# Patient Record
Sex: MALE | Race: OTHER | HISPANIC OR LATINO
[De-identification: names, ages, dates, MRNs, and addresses within clinical notes are randomized per-mention and may not be internally consistent; named-entity substitution may affect disease eponyms.]

---

## 2024-01-08 PROBLEM — Z00.00 ENCOUNTER FOR PREVENTIVE HEALTH EXAMINATION: Status: ACTIVE | Noted: 2024-01-08

## 2024-01-29 ENCOUNTER — NON-APPOINTMENT (OUTPATIENT)
Age: 59
End: 2024-01-29

## 2024-01-29 ENCOUNTER — APPOINTMENT (OUTPATIENT)
Dept: HEART AND VASCULAR | Facility: CLINIC | Age: 59
End: 2024-01-29
Payer: MEDICAID

## 2024-01-29 VITALS
SYSTOLIC BLOOD PRESSURE: 151 MMHG | HEART RATE: 81 BPM | DIASTOLIC BLOOD PRESSURE: 99 MMHG | WEIGHT: 200 LBS | HEIGHT: 65 IN | BODY MASS INDEX: 33.32 KG/M2

## 2024-01-29 DIAGNOSIS — I48.3 TYPICAL ATRIAL FLUTTER: ICD-10-CM

## 2024-01-29 DIAGNOSIS — F17.290 NICOTINE DEPENDENCE, OTHER TOBACCO PRODUCT, UNCOMPLICATED: ICD-10-CM

## 2024-01-29 PROCEDURE — 99204 OFFICE O/P NEW MOD 45 MIN: CPT | Mod: 25

## 2024-01-29 PROCEDURE — 93000 ELECTROCARDIOGRAM COMPLETE: CPT

## 2024-01-29 RX ORDER — ATORVASTATIN CALCIUM 80 MG/1
TABLET, FILM COATED ORAL
Refills: 0 | Status: ACTIVE | COMMUNITY

## 2024-01-29 RX ORDER — LOSARTAN POTASSIUM 50 MG/1
50 TABLET, FILM COATED ORAL
Refills: 0 | Status: ACTIVE | COMMUNITY

## 2024-01-29 RX ORDER — ASPIRIN 81 MG
81 TABLET, DELAYED RELEASE (ENTERIC COATED) ORAL
Refills: 0 | Status: DISCONTINUED | COMMUNITY
End: 2024-01-29

## 2024-01-29 RX ORDER — METOPROLOL SUCCINATE 25 MG/1
25 TABLET, EXTENDED RELEASE ORAL
Refills: 0 | Status: ACTIVE | COMMUNITY

## 2024-02-12 NOTE — HISTORY OF PRESENT ILLNESS
[FreeTextEntry1] : Mr. Maria Teresa Villa is a  pleasant 58 year-old Amharic speaking male with a past medical history significant for HTN, HLD and atrial flutter who is referred by Dr. Rivera for initial evaluation.   He noted new onset PICKETT in Fall 2023.  Evaluated by Dr. Rivera and found to be in atrial flutter.  He denies awareness of rapid/irregular heart action, dizziness, presyncope/syncope.   Tolerating Eliquis without bleeding issues.

## 2024-02-12 NOTE — DISCUSSION/SUMMARY
[FreeTextEntry1] : Mr. Maria Teresa Villa is a  pleasant 58 year-old Chinese speaking male with a past medical history significant for HTN, HLD and atrial flutter who presents for initial evaluation.  We discussed in detail the normal conduction system of the heart and what atrial flutter is.  We discussed treatment options including rate control vs. rhythm control with antiarrhythmic medications or an ablation.    We discussed the procedure in detail including risks, benefits, and alternatives.  Risks including, but not limited to; infection, anesthesia reaction, bleeding, pain, vascular injury, cardiac perforation, TE/CVA,  arrhythmia recurrence and death were discussed.  All questions answered and the patient was given pre procedure instructions.  Advised to call with any questions or concerns.

## 2024-02-12 NOTE — ADDENDUM
[FreeTextEntry1] : I, Bill Reyes, hereby attest that the medical record entry for this patient accurately reflects signatures/notations that I made on the Date of Service in my capacity as an Attending Physician when I treated/diagnosed the above patient. I do hereby attest that this information is true, accurate and complete to the best of my knowledge.  I was present for the entire visit and supervised the entire visit and made/agree with the plan as outlined.

## 2024-03-12 ENCOUNTER — RX RENEWAL (OUTPATIENT)
Age: 59
End: 2024-03-12

## 2024-03-12 RX ORDER — APIXABAN 5 MG/1
5 TABLET, FILM COATED ORAL
Qty: 60 | Refills: 5 | Status: ACTIVE | COMMUNITY
Start: 2024-01-29 | End: 1900-01-01

## 2024-04-03 ENCOUNTER — OUTPATIENT (OUTPATIENT)
Dept: OUTPATIENT SERVICES | Facility: HOSPITAL | Age: 59
LOS: 1 days | Discharge: HOME | End: 2024-04-03
Payer: COMMERCIAL

## 2024-04-03 ENCOUNTER — RESULT REVIEW (OUTPATIENT)
Age: 59
End: 2024-04-03

## 2024-04-03 VITALS — WEIGHT: 199.96 LBS | HEIGHT: 65 IN

## 2024-04-03 DIAGNOSIS — I48.92 UNSPECIFIED ATRIAL FLUTTER: ICD-10-CM

## 2024-04-03 LAB
ALBUMIN SERPL ELPH-MCNC: 4.7 G/DL — SIGNIFICANT CHANGE UP (ref 3.3–5)
ALP SERPL-CCNC: 62 U/L — SIGNIFICANT CHANGE UP (ref 40–120)
ALT FLD-CCNC: 26 U/L — SIGNIFICANT CHANGE UP (ref 10–45)
ANION GAP SERPL CALC-SCNC: 7 MMOL/L — SIGNIFICANT CHANGE UP (ref 5–17)
APTT BLD: 32.9 SEC — SIGNIFICANT CHANGE UP (ref 24.5–35.6)
AST SERPL-CCNC: 31 U/L — SIGNIFICANT CHANGE UP (ref 10–40)
BILIRUB SERPL-MCNC: 0.4 MG/DL — SIGNIFICANT CHANGE UP (ref 0.2–1.2)
BLD GP AB SCN SERPL QL: NEGATIVE — SIGNIFICANT CHANGE UP
BUN SERPL-MCNC: 26 MG/DL — HIGH (ref 7–23)
CALCIUM SERPL-MCNC: 10.1 MG/DL — SIGNIFICANT CHANGE UP (ref 8.4–10.5)
CHLORIDE SERPL-SCNC: 102 MMOL/L — SIGNIFICANT CHANGE UP (ref 96–108)
CO2 SERPL-SCNC: 29 MMOL/L — SIGNIFICANT CHANGE UP (ref 22–31)
CREAT SERPL-MCNC: 1.1 MG/DL — SIGNIFICANT CHANGE UP (ref 0.5–1.3)
EGFR: 78 ML/MIN/1.73M2 — SIGNIFICANT CHANGE UP
GLUCOSE SERPL-MCNC: 87 MG/DL — SIGNIFICANT CHANGE UP (ref 70–99)
HCT VFR BLD CALC: 46.3 % — SIGNIFICANT CHANGE UP (ref 39–50)
HGB BLD-MCNC: 15.3 G/DL — SIGNIFICANT CHANGE UP (ref 13–17)
INR BLD: 0.93 — SIGNIFICANT CHANGE UP (ref 0.85–1.18)
MCHC RBC-ENTMCNC: 31.9 PG — SIGNIFICANT CHANGE UP (ref 27–34)
MCHC RBC-ENTMCNC: 33 GM/DL — SIGNIFICANT CHANGE UP (ref 32–36)
MCV RBC AUTO: 96.7 FL — SIGNIFICANT CHANGE UP (ref 80–100)
NRBC # BLD: 0 /100 WBCS — SIGNIFICANT CHANGE UP (ref 0–0)
PLATELET # BLD AUTO: 195 K/UL — SIGNIFICANT CHANGE UP (ref 150–400)
POTASSIUM SERPL-MCNC: 4.4 MMOL/L — SIGNIFICANT CHANGE UP (ref 3.5–5.3)
POTASSIUM SERPL-SCNC: 4.4 MMOL/L — SIGNIFICANT CHANGE UP (ref 3.5–5.3)
PROT SERPL-MCNC: 7.9 G/DL — SIGNIFICANT CHANGE UP (ref 6–8.3)
PROTHROM AB SERPL-ACNC: 10.6 SEC — SIGNIFICANT CHANGE UP (ref 9.5–13)
RBC # BLD: 4.79 M/UL — SIGNIFICANT CHANGE UP (ref 4.2–5.8)
RBC # FLD: 12.2 % — SIGNIFICANT CHANGE UP (ref 10.3–14.5)
RH IG SCN BLD-IMP: POSITIVE — SIGNIFICANT CHANGE UP
SODIUM SERPL-SCNC: 138 MMOL/L — SIGNIFICANT CHANGE UP (ref 135–145)
WBC # BLD: 7.39 K/UL — SIGNIFICANT CHANGE UP (ref 3.8–10.5)
WBC # FLD AUTO: 7.39 K/UL — SIGNIFICANT CHANGE UP (ref 3.8–10.5)

## 2024-04-03 PROCEDURE — 93312 ECHO TRANSESOPHAGEAL: CPT | Mod: 26

## 2024-04-03 PROCEDURE — 85610 PROTHROMBIN TIME: CPT

## 2024-04-03 PROCEDURE — 93320 DOPPLER ECHO COMPLETE: CPT | Mod: 26

## 2024-04-03 PROCEDURE — 86901 BLOOD TYPING SEROLOGIC RH(D): CPT

## 2024-04-03 PROCEDURE — 85027 COMPLETE CBC AUTOMATED: CPT

## 2024-04-03 PROCEDURE — 36415 COLL VENOUS BLD VENIPUNCTURE: CPT

## 2024-04-03 PROCEDURE — C1730: CPT

## 2024-04-03 PROCEDURE — 85730 THROMBOPLASTIN TIME PARTIAL: CPT

## 2024-04-03 PROCEDURE — 93312 ECHO TRANSESOPHAGEAL: CPT

## 2024-04-03 PROCEDURE — 86850 RBC ANTIBODY SCREEN: CPT

## 2024-04-03 PROCEDURE — 76376 3D RENDER W/INTRP POSTPROCES: CPT | Mod: 26

## 2024-04-03 PROCEDURE — C1760: CPT

## 2024-04-03 PROCEDURE — 93653 COMPRE EP EVAL TX SVT: CPT

## 2024-04-03 PROCEDURE — C2630: CPT

## 2024-04-03 PROCEDURE — 80053 COMPREHEN METABOLIC PANEL: CPT

## 2024-04-03 PROCEDURE — 86900 BLOOD TYPING SEROLOGIC ABO: CPT

## 2024-04-03 PROCEDURE — C1766: CPT

## 2024-04-03 PROCEDURE — C1894: CPT

## 2024-04-03 RX ORDER — ATORVASTATIN CALCIUM 80 MG/1
1 TABLET, FILM COATED ORAL
Refills: 0 | DISCHARGE

## 2024-04-03 RX ORDER — LOSARTAN POTASSIUM 100 MG/1
1 TABLET, FILM COATED ORAL
Refills: 0 | DISCHARGE

## 2024-04-03 RX ORDER — METOPROLOL TARTRATE 50 MG
1 TABLET ORAL
Refills: 0 | DISCHARGE

## 2024-04-03 RX ORDER — APIXABAN 2.5 MG/1
1 TABLET, FILM COATED ORAL
Refills: 0 | DISCHARGE

## 2024-04-03 RX ORDER — ATORVASTATIN CALCIUM 80 MG/1
40 TABLET, FILM COATED ORAL AT BEDTIME
Refills: 0 | Status: DISCONTINUED | OUTPATIENT
Start: 2024-04-03 | End: 2024-04-03

## 2024-04-03 RX ORDER — APIXABAN 2.5 MG/1
5 TABLET, FILM COATED ORAL ONCE
Refills: 0 | Status: DISCONTINUED | OUTPATIENT
Start: 2024-04-03 | End: 2024-04-03

## 2024-04-03 RX ORDER — METOPROLOL TARTRATE 50 MG
25 TABLET ORAL DAILY
Refills: 0 | Status: DISCONTINUED | OUTPATIENT
Start: 2024-04-03 | End: 2024-04-03

## 2024-04-03 RX ORDER — APIXABAN 2.5 MG/1
5 TABLET, FILM COATED ORAL
Refills: 0 | Status: DISCONTINUED | OUTPATIENT
Start: 2024-04-03 | End: 2024-04-03

## 2024-04-03 NOTE — H&P ADULT - ASSESSMENT
58 year-old Sudanese speaking male with a past medical history significant for HTN, HLD and atrial flutter who presents for scheduled ablation.

## 2024-04-03 NOTE — H&P ADULT - HISTORY OF PRESENT ILLNESS
58 year-old Wolof speaking male with a past medical history significant for HTN, HLD and atrial flutter who presents for scheduled ablation.   He noted new onset PICKETT in Fall 2023. Evaluated by Dr. Rivera and found to be in atrial flutter. He denies awareness of rapid/irregular heart action, dizziness, presyncope/syncope. Tolerating Eliquis without bleeding issues.  ?

## 2024-04-04 PROBLEM — E78.5 HYPERLIPIDEMIA, UNSPECIFIED: Chronic | Status: ACTIVE | Noted: 2024-04-03

## 2024-04-04 PROBLEM — I10 ESSENTIAL (PRIMARY) HYPERTENSION: Chronic | Status: ACTIVE | Noted: 2024-04-03

## 2024-04-15 ENCOUNTER — NON-APPOINTMENT (OUTPATIENT)
Age: 59
End: 2024-04-15

## 2024-04-15 ENCOUNTER — APPOINTMENT (OUTPATIENT)
Dept: HEART AND VASCULAR | Facility: CLINIC | Age: 59
End: 2024-04-15
Payer: COMMERCIAL

## 2024-04-15 VITALS
DIASTOLIC BLOOD PRESSURE: 85 MMHG | WEIGHT: 200 LBS | TEMPERATURE: 97.8 F | HEIGHT: 65 IN | HEART RATE: 56 BPM | BODY MASS INDEX: 33.32 KG/M2 | SYSTOLIC BLOOD PRESSURE: 136 MMHG | OXYGEN SATURATION: 99 %

## 2024-04-15 PROCEDURE — 99213 OFFICE O/P EST LOW 20 MIN: CPT | Mod: 25

## 2024-04-15 PROCEDURE — 93000 ELECTROCARDIOGRAM COMPLETE: CPT

## 2024-04-15 NOTE — HISTORY OF PRESENT ILLNESS
[FreeTextEntry1] : Mr. Maria Teresa Villa is a  pleasant 58 year-old Yi speaking male with a past medical history significant for HTN, HLD and atrial flutter S/P CTI ablation 4/3/24.  He presents for post procedure follow-up and feels well.  Notes improvement in PICKETT.  He denies awareness of rapid/irregular heart action, dizziness, presyncope/syncope.   Tolerating Eliquis without bleeding issues.

## 2024-04-15 NOTE — ADDENDUM
[FreeTextEntry1] : I, Florina Wilkinson, am scribing for and the presence of Dr. Reyes the following sections: HPI, PMH,Family/social history, ROS, Physical Exam, Assessment / Plan.   I, Bill Reyes, personally performed the services described in the documentation, reviewed the documentation recorded by the scribe in my presence and it accurately and completely records my words and actions.

## 2024-04-15 NOTE — DISCUSSION/SUMMARY
[FreeTextEntry1] : Mr. Maria Teresa Villa is a  pleasant 58 year-old Tajik speaking male with a past medical history significant for HTN, HLD and atrial flutter S/P CTI ablation 4/3/24.   1.  AFL  Maintaining sinus rhythm post procedure  Continue Metoprolol for rate control  2.  Stroke Risk Continue Eliquis - CHADS VASc 1  3.  Arrhythmia surveillance Recommend ILR placement.  He will consider this.   F/U 3 months, sooner as needed

## 2024-06-03 ENCOUNTER — NON-APPOINTMENT (OUTPATIENT)
Age: 59
End: 2024-06-03

## 2024-06-03 ENCOUNTER — APPOINTMENT (OUTPATIENT)
Dept: HEART AND VASCULAR | Facility: CLINIC | Age: 59
End: 2024-06-03
Payer: MEDICAID

## 2024-06-03 VITALS
HEIGHT: 65 IN | BODY MASS INDEX: 33.32 KG/M2 | DIASTOLIC BLOOD PRESSURE: 79 MMHG | HEART RATE: 58 BPM | SYSTOLIC BLOOD PRESSURE: 132 MMHG | WEIGHT: 200 LBS

## 2024-06-03 PROCEDURE — 99213 OFFICE O/P EST LOW 20 MIN: CPT | Mod: 25

## 2024-06-03 PROCEDURE — 93000 ELECTROCARDIOGRAM COMPLETE: CPT

## 2024-06-06 NOTE — DISCUSSION/SUMMARY
[FreeTextEntry1] : Mr. Maria Teresa Villa is a  pleasant 58 year-old Lao speaking male with a past medical history significant for HTN, HLD and atrial flutter S/P CTI ablation 4/3/24.   1.  AFL  Maintaining sinus rhythm post procedure  Continue Metoprolol for rate control  2.  Stroke Risk Continue Eliquis - CHADS VASc 1  3.  Arrhythmia surveillance He is amenable to ILR placement and will call to schedule  F/U 1 month post procedure, sooner as needed

## 2024-06-06 NOTE — HISTORY OF PRESENT ILLNESS
[FreeTextEntry1] : Mr. Maria Teresa Villa is a pleasant 58 year-old Urdu speaking male with a past medical history significant for HTN, HLD and atrial flutter S/P CTI ablation 4/3/24.    Notes improvement in PICKETT post procedure.  He denies awareness of rapid/irregular heart action, dizziness, presyncope/syncope.   Tolerating Eliquis without bleeding issues.  LTM vs ILR for heart rhythm monitoring discussed at prior visit and he declined.  Normal LV systolic function on ULICES 4/3/24.

## 2024-10-14 ENCOUNTER — RX RENEWAL (OUTPATIENT)
Age: 59
End: 2024-10-14

## 2025-03-26 ENCOUNTER — RX RENEWAL (OUTPATIENT)
Age: 60
End: 2025-03-26

## 2025-05-05 ENCOUNTER — NON-APPOINTMENT (OUTPATIENT)
Age: 60
End: 2025-05-05

## 2025-05-05 ENCOUNTER — APPOINTMENT (OUTPATIENT)
Dept: HEART AND VASCULAR | Facility: CLINIC | Age: 60
End: 2025-05-05
Payer: MEDICAID

## 2025-05-05 VITALS
WEIGHT: 218 LBS | SYSTOLIC BLOOD PRESSURE: 135 MMHG | BODY MASS INDEX: 36.32 KG/M2 | OXYGEN SATURATION: 97 % | HEIGHT: 65 IN | TEMPERATURE: 97.9 F | HEART RATE: 57 BPM | DIASTOLIC BLOOD PRESSURE: 80 MMHG

## 2025-05-05 DIAGNOSIS — I48.3 TYPICAL ATRIAL FLUTTER: ICD-10-CM

## 2025-05-05 PROCEDURE — 93000 ELECTROCARDIOGRAM COMPLETE: CPT

## 2025-05-05 PROCEDURE — 99214 OFFICE O/P EST MOD 30 MIN: CPT | Mod: 25

## 2025-06-10 ENCOUNTER — OUTPATIENT (OUTPATIENT)
Dept: OUTPATIENT SERVICES | Facility: HOSPITAL | Age: 60
LOS: 1 days | End: 2025-06-10

## 2025-06-10 ENCOUNTER — EMERGENCY (EMERGENCY)
Facility: HOSPITAL | Age: 60
LOS: 1 days | End: 2025-06-10
Attending: STUDENT IN AN ORGANIZED HEALTH CARE EDUCATION/TRAINING PROGRAM | Admitting: STUDENT IN AN ORGANIZED HEALTH CARE EDUCATION/TRAINING PROGRAM
Payer: COMMERCIAL

## 2025-06-10 VITALS
TEMPERATURE: 98 F | HEART RATE: 54 BPM | SYSTOLIC BLOOD PRESSURE: 130 MMHG | HEIGHT: 65 IN | OXYGEN SATURATION: 97 % | RESPIRATION RATE: 18 BRPM | WEIGHT: 214.95 LBS | DIASTOLIC BLOOD PRESSURE: 88 MMHG

## 2025-06-10 VITALS
RESPIRATION RATE: 18 BRPM | OXYGEN SATURATION: 99 % | SYSTOLIC BLOOD PRESSURE: 145 MMHG | HEART RATE: 52 BPM | TEMPERATURE: 97 F | DIASTOLIC BLOOD PRESSURE: 88 MMHG

## 2025-06-10 LAB
ANION GAP SERPL CALC-SCNC: 8 MMOL/L — SIGNIFICANT CHANGE UP (ref 5–17)
BASOPHILS # BLD AUTO: 0.04 K/UL — SIGNIFICANT CHANGE UP (ref 0–0.2)
BASOPHILS NFR BLD AUTO: 0.6 % — SIGNIFICANT CHANGE UP (ref 0–2)
BUN SERPL-MCNC: 15 MG/DL — SIGNIFICANT CHANGE UP (ref 7–23)
CALCIUM SERPL-MCNC: 9.6 MG/DL — SIGNIFICANT CHANGE UP (ref 8.4–10.5)
CHLORIDE SERPL-SCNC: 104 MMOL/L — SIGNIFICANT CHANGE UP (ref 96–108)
CO2 SERPL-SCNC: 24 MMOL/L — SIGNIFICANT CHANGE UP (ref 22–31)
CREAT SERPL-MCNC: 1.02 MG/DL — SIGNIFICANT CHANGE UP (ref 0.5–1.3)
EGFR: 85 ML/MIN/1.73M2 — SIGNIFICANT CHANGE UP
EGFR: 85 ML/MIN/1.73M2 — SIGNIFICANT CHANGE UP
EOSINOPHIL # BLD AUTO: 0.13 K/UL — SIGNIFICANT CHANGE UP (ref 0–0.5)
EOSINOPHIL NFR BLD AUTO: 2 % — SIGNIFICANT CHANGE UP (ref 0–6)
GLUCOSE SERPL-MCNC: 92 MG/DL — SIGNIFICANT CHANGE UP (ref 70–99)
HCT VFR BLD CALC: 42.3 % — SIGNIFICANT CHANGE UP (ref 39–50)
HGB BLD-MCNC: 14.3 G/DL — SIGNIFICANT CHANGE UP (ref 13–17)
IMM GRANULOCYTES NFR BLD AUTO: 0.3 % — SIGNIFICANT CHANGE UP (ref 0–0.9)
LYMPHOCYTES # BLD AUTO: 1.4 K/UL — SIGNIFICANT CHANGE UP (ref 1–3.3)
LYMPHOCYTES # BLD AUTO: 21.5 % — SIGNIFICANT CHANGE UP (ref 13–44)
MCHC RBC-ENTMCNC: 32.4 PG — SIGNIFICANT CHANGE UP (ref 27–34)
MCHC RBC-ENTMCNC: 33.8 G/DL — SIGNIFICANT CHANGE UP (ref 32–36)
MCV RBC AUTO: 95.7 FL — SIGNIFICANT CHANGE UP (ref 80–100)
MONOCYTES # BLD AUTO: 0.85 K/UL — SIGNIFICANT CHANGE UP (ref 0–0.9)
MONOCYTES NFR BLD AUTO: 13.1 % — SIGNIFICANT CHANGE UP (ref 2–14)
NEUTROPHILS # BLD AUTO: 4.07 K/UL — SIGNIFICANT CHANGE UP (ref 1.8–7.4)
NEUTROPHILS NFR BLD AUTO: 62.5 % — SIGNIFICANT CHANGE UP (ref 43–77)
NRBC BLD AUTO-RTO: 0 /100 WBCS — SIGNIFICANT CHANGE UP (ref 0–0)
PLATELET # BLD AUTO: 177 K/UL — SIGNIFICANT CHANGE UP (ref 150–400)
POTASSIUM SERPL-MCNC: 4.6 MMOL/L — SIGNIFICANT CHANGE UP (ref 3.5–5.3)
POTASSIUM SERPL-MCNC: SIGNIFICANT CHANGE UP (ref 3.5–5.3)
POTASSIUM SERPL-SCNC: 4.6 MMOL/L — SIGNIFICANT CHANGE UP (ref 3.5–5.3)
POTASSIUM SERPL-SCNC: SIGNIFICANT CHANGE UP (ref 3.5–5.3)
RBC # BLD: 4.42 M/UL — SIGNIFICANT CHANGE UP (ref 4.2–5.8)
RBC # FLD: 12.8 % — SIGNIFICANT CHANGE UP (ref 10.3–14.5)
SODIUM SERPL-SCNC: 136 MMOL/L — SIGNIFICANT CHANGE UP (ref 135–145)
TROPONIN T, HIGH SENSITIVITY RESULT: <6 NG/L — SIGNIFICANT CHANGE UP (ref 0–51)
WBC # BLD: 6.51 K/UL — SIGNIFICANT CHANGE UP (ref 3.8–10.5)
WBC # FLD AUTO: 6.51 K/UL — SIGNIFICANT CHANGE UP (ref 3.8–10.5)

## 2025-06-10 PROCEDURE — 85025 COMPLETE CBC W/AUTO DIFF WBC: CPT

## 2025-06-10 PROCEDURE — 99284 EMERGENCY DEPT VISIT MOD MDM: CPT | Mod: 25

## 2025-06-10 PROCEDURE — 71045 X-RAY EXAM CHEST 1 VIEW: CPT | Mod: 26

## 2025-06-10 PROCEDURE — 96374 THER/PROPH/DIAG INJ IV PUSH: CPT

## 2025-06-10 PROCEDURE — 84484 ASSAY OF TROPONIN QUANT: CPT

## 2025-06-10 PROCEDURE — 84132 ASSAY OF SERUM POTASSIUM: CPT

## 2025-06-10 PROCEDURE — 99285 EMERGENCY DEPT VISIT HI MDM: CPT

## 2025-06-10 PROCEDURE — 71045 X-RAY EXAM CHEST 1 VIEW: CPT

## 2025-06-10 PROCEDURE — 80048 BASIC METABOLIC PNL TOTAL CA: CPT

## 2025-06-10 PROCEDURE — 36415 COLL VENOUS BLD VENIPUNCTURE: CPT

## 2025-06-10 RX ORDER — KETOROLAC TROMETHAMINE 30 MG/ML
30 INJECTION, SOLUTION INTRAMUSCULAR; INTRAVENOUS ONCE
Refills: 0 | Status: DISCONTINUED | OUTPATIENT
Start: 2025-06-10 | End: 2025-06-10

## 2025-06-10 RX ADMIN — KETOROLAC TROMETHAMINE 30 MILLIGRAM(S): 30 INJECTION, SOLUTION INTRAMUSCULAR; INTRAVENOUS at 17:57

## 2025-06-10 NOTE — ED PROVIDER NOTE - PROGRESS NOTE DETAILS
Patient is updated on lab and/or imaging results. Patient understands plan, return precaution and gives verbal feedback. Patient's symptoms are improved. Patient agrees to f/u with primary care and/or specialist. Discharge.

## 2025-06-10 NOTE — ED PROVIDER NOTE - PATIENT PORTAL LINK FT
You can access the FollowMyHealth Patient Portal offered by [facility] by registering at the following website: [email or web address]. By joining [facility]’s FollowMyHealth portal, you will also be able to view your health information using other applications (apps) compatible with our system.

## 2025-06-10 NOTE — ED PROVIDER NOTE - NSFOLLOWUPINSTRUCTIONS_ED_ALL_ED_FT
¿Qué necesito saber acerca del dolor de pecho?El dolor en el pecho puede ser provocado por alondra variedad de condiciones, algunas no tan serias y otras que son de peligro mortal. Es importante que programe alondra vanessa con hamm médico para determinar la causa de hamm dolor de pecho.    ¿Qué provoca o aumenta mi riesgo de tener dolor de pecho?    Un problema de digestión, chadd el reflujo ácido o alondra úlcera estomacal    Un ataque de ansiedad o alondra emoción kenny, chadd la maranda    Alondra infección, inflamación o fractura en los huesos o un cartílago en el pecho    Flujo de kaitlyn deficiente hacia el corazón (angina)    Alondra condición potencialmente mortal, chadd un ataque al corazón o un coágulo de kaitlyn en los pulmones  ¿Cuáles otros síntomas podrían presentarse con el dolor en el pecho?    Alondra sensación de ardor detrás del esternón    Ritmo cardíaco acelerado o lento    Fiebre o sudoración    Náuseas o vómitos    Falta de aliento    Molestia o presión que se propaga del pecho a la espalda, mandíbula o brazo    Sensación de debilidad, cansancio o desmayo  ¿Cómo se diagnostica la causa del dolor en el pecho?Hamm médico lo examinará. Describa hamm dolor en el pecho con el fab detalle que sea posible. Infórmele a hamm médico dónde se encuentra hamm dolor y cuándo comenzó. Coméntele si usted nota algo que hace empeorar o mejorar el dolor. Informe a hamm médico si es samia o va y viene. Incluya también cualquier otro síntoma que tenga junto con el dolor en el pecho, chadd sudoración o náuseas. Hamm médico le preguntará cuáles son los medicamentos que milena y acerca de cualquier condición médica que tenga. Dígale a hamm médico si tiene antecedentes familiares de enfermedad cardíaca. Es posible que también deba hacerse alguno de los siguientes exámenes:    Un electrocardiogramaes un examen que registra la actividad eléctrica de hamm corazón.    Los análisis de sangrerevisan si hay daños en el corazón y signos de ataque cardíaco.    Un ecocardiogramausa ondas de linh para guanako si la kaitlyn fluye normalmente a través del corazón.    Un ultrasonido, alondra radiografía, alondra tomografía computarizada o alondra imagen por resonancia magnética (IRM)podría mostrar la causa de hamm dolor de pecho. Es posible que le administren líquido de contraste para que hamm corazón se soni mejor en las imágenes. Dígale al médico si usted alguna vez ha tenido alondra reacción alérgica al líquido de contraste. No entre a la tiffany donde se realiza la resonancia magnética con algo de metal. El metal puede causar lesiones serias. Informe a hamm médico si usted tiene algún metal dentro o sobre hamm cuerpo.    Alondra endoscopiapuede hacerse para revisar si tiene úlceras o problemas con el esófago.  Endoscopia superior  ¿Cómo se trata el dolor en el pecho?    Los medicamentospueden administrarse para tratar la causa del dolor de pecho. Por ejemplo, analgésicos, medicamentos para la ansiedad o medicamentos para aumentar el flujo de kaitlyn al corazón. No tome ciertos medicamentos sin antes preguntarle a hamm médico. Estos incluyen GARY, suplementos vitamínicos y hormonas, chadd el estrógeno o la progestina.    Un stentpodría colocarse si el dolor de pecho es causado por alondra obstrucción en el corazón. Un stent es un pequeño tubo elaborado en kashif de alambre que ayuda a mantener abierta la arteria. Usted podría necesitar más de 1 stent.  Angioplastia de la arteria coronaria (stent)  ¿Cuáles son algunos consejos para vivir alondra karen ernestina?Si se conoce la causa de hamm dolor de pecho, hamm médico le dará pautas específicas a seguir. Los siguientes son consejos generales de nghia:    No fume.La nicotina y otros químicos contenidos en los cigarrillos y cigarros pueden causar daño a amber pulmones y el corazón. Pida información a hamm médico si usted actualmente fuma y necesita ayuda para dejar de fumar. Los cigarrillos electrónicos o el tabaco sin humo igualmente contienen nicotina. Consulte con hamm odontólogo antes de utilizar estos productos.    Elija alondra variedad de alimentos saludables tan a menudo chadd sea posible.Incluya frutas y verduras frescas, congeladas o enlatadas. También incluya productos lácteos bajos en grasa, pescado, robert (sin piel) y jay jay magras. Hamm médico o un dietista pueden ayudarlo a crear planes de alimentación. Es posible que tenga que evitar ciertos alimentos o bebidas si el dolor es causado por un problema de digestión.  Alimentos saludables      Reduzca el consumo de sodio (sal).Limite el consumo de alimentos altos en sodio, chadd comidas enlatadas, bocadillos salados y embutidos. Si añade jw cuando cocina la comida, no añada más en la watters. Elija alimentos enlatados bajos en sodio tanto chadd sea posible.        Consuma abundante agua al día.El agua ayuda al cuerpo a controlar la temperatura y la presión arterial. Pregunte a hamm médico cuánta agua debería beber cada día.    Pregunte acerca de la actividad.Hamm médico le dirá cuáles son las actividades que debe limitar o evitar. Pregunte cuándo puede manejar, regresar a hamm trabajo y tener relaciones sexuales. Pida más información acerca de un plan de ejercicio adecuado para usted.    Mantenga un peso saludable.Pregúntele a hamm médico cuál es el peso ideal para usted. Solicite a hamm médico que le ayude a crear un plan para perder peso de alondra forma lee si usted tiene sobrepeso.    Pregunte sobre las vacunas que pudiera necesitar.Hamm médico puede indicarle si también necesita vacunas no enumeradas a continuación y cuándo recibirlas:  Pregunte a hamm médico sobre las vacunas para gripe y neumonía.Todos los adultos deben recibir la vacuna contra la gripe (influenza) tan pronto chadd se recomiende cada año, generalmente en septiembre u octubre. Se recomienda la vacuna contra la neumonía para todos los adultos de 50 años o más, para prevenir la enfermedad neumocócica chadd la neumonía. Los adultos de entre 19 y 49 años que están en alto riesgo de enfermedad neumocócica también deberían recibir la vacuna. Es posible que necesite 1 dosis o 2. El número depende de la vacuna utilizada y de amber factores de riesgo.    Las vacunas contra la COVID-19 se administran a los adultos en forma de inyección.Se recomienda al menos 1 dosis de alondra vacuna actualizada para todas los adultos. Las vacunas contra la COVID-19 se actualizan a lo javed del año. Los adultos de 65 años o más necesitan alondra segunda dosis de vacuna actualizada al menos 4 meses después de la primera dosis. Hamm médico puede ayudarle a programar todas las dosis necesarias a medida que se disponga de vacunas actualizadas.  Evite la enfermedad cardíaca  Llame al número local de emergencias (911 en los [place]) o pídale a alguien que llame si:    Tiene alguno de los siguientes signos de un ataque cardíaco:  Estrujamiento, presión o tensión en hamm pecho    Usted también podría presentar alguno de los siguientes:  Malestar o dolor en hamm espalda, eris, mandíbula, abdomen, o brazo    Falta de aliento    Náuseas o vómitos    Desvanecimiento o sudor frío repentino    ¿Cuándo reba buscar atención inmediata?    La inflamación en hamm pecho empeora, aun con tratamiento.    Usted tose o vomita kaitlyn.    Amber heces son negras o tienen kaitlyn.    Usted no puede dejar de vomitar o le duele al tragar.  ¿Cuándo reba llamar a mi médico?    Usted tiene preguntas o inquietudes acerca de hamm condición o cuidado.

## 2025-06-10 NOTE — ED ADULT TRIAGE NOTE - CHIEF COMPLAINT QUOTE
Pt c/o chest pain since yesterday. Pt coming down from EP for scheduled loop recorder placement this AM. Pt endorsed cp and since not resolved. EKG in progress.

## 2025-06-10 NOTE — ED PROVIDER NOTE - PHYSICAL EXAMINATION
VITAL SIGNS: I have reviewed nursing notes and confirm.  CONSTITUTIONAL: Well appearing, in no acute distress.   SKIN:  warm and dry, no acute rash.   HEAD:  normocephalic, atraumatic.  EYES: EOM intact; conjunctiva and sclera clear.  ENT: No nasal discharge; airway clear.   NECK: Supple.  CARD: S1, S2, bradycardiac, regular rhythm. mild ttp to sternal chest   RESP:  Clear to auscultation b/l, no wheezes, rales or rhonchi.  ABD: Normal bowel sounds; soft; non-distended; non-tender; no guarding/ rebound.  EXT: Normal ROM. No peripheral edema. Pulses intact and equal b/l.  NEURO: Alert, oriented, grossly unremarkable

## 2025-06-10 NOTE — ED PROVIDER NOTE - OBJECTIVE STATEMENT
59 M, hx of Aflutter on eliquis, HTN, HLD, p/w sternal chest pain x 2 days. spontaneous pain. pain worse when twisting the upper body. not exertional or pleuritic. no vomiting, diaphoresis, radiation. Smokes cigs. Denied hx of blood clot, , active cancer, surgery in the past month, recent distance traveling, unilateral leg swelling, or OCP use. was supposed to have loop recorder placed this morning, but was aborted due to chest pain.

## 2025-06-10 NOTE — ED PROVIDER NOTE - CLINICAL SUMMARY MEDICAL DECISION MAKING FREE TEXT BOX
exam consistent with MSK pain such as costochondritis. EKG sinus sarahy, no STD/E. TW flattening in III. Given risk factor, will get trop. Wells 0, doubt PE. will r/o PNA, PTX. doubt dissection. labs, imaging, pain control. if negative workup, likely dc with cards fu.

## 2025-06-10 NOTE — ED ADULT NURSE NOTE - OBJECTIVE STATEMENT
59yM presents to the ED with reports of x2days of midsternal chest pain. pt reports sudden onset of discomfort, reports worse with twisting motion. denies any worsening pain with exertion or deep breathing. A&Ox4. Breathing spontaneously and unlabored on room air. denies N/V, diaphoresis, dizziness/lightheadedness, numbness/tingling, weakness, back pain. pt went to get loop recorder this morning, sent down to ED for eval due to c/o chest pain.

## 2025-06-13 DIAGNOSIS — F17.200 NICOTINE DEPENDENCE, UNSPECIFIED, UNCOMPLICATED: ICD-10-CM

## 2025-06-13 DIAGNOSIS — R00.1 BRADYCARDIA, UNSPECIFIED: ICD-10-CM

## 2025-06-13 DIAGNOSIS — R07.89 OTHER CHEST PAIN: ICD-10-CM

## 2025-06-13 DIAGNOSIS — I10 ESSENTIAL (PRIMARY) HYPERTENSION: ICD-10-CM

## 2025-06-13 DIAGNOSIS — E78.5 HYPERLIPIDEMIA, UNSPECIFIED: ICD-10-CM

## 2025-06-13 DIAGNOSIS — Z79.01 LONG TERM (CURRENT) USE OF ANTICOAGULANTS: ICD-10-CM

## 2025-06-13 DIAGNOSIS — I48.92 UNSPECIFIED ATRIAL FLUTTER: ICD-10-CM
